# Patient Record
Sex: FEMALE | Race: WHITE | NOT HISPANIC OR LATINO
[De-identification: names, ages, dates, MRNs, and addresses within clinical notes are randomized per-mention and may not be internally consistent; named-entity substitution may affect disease eponyms.]

---

## 2023-07-20 ENCOUNTER — NON-APPOINTMENT (OUTPATIENT)
Age: 83
End: 2023-07-20

## 2023-07-20 ENCOUNTER — APPOINTMENT (OUTPATIENT)
Dept: BREAST CENTER | Facility: CLINIC | Age: 83
End: 2023-07-20
Payer: MEDICARE

## 2023-07-20 VITALS — WEIGHT: 130 LBS | HEIGHT: 66 IN | BODY MASS INDEX: 20.89 KG/M2

## 2023-07-20 DIAGNOSIS — Z80.0 FAMILY HISTORY OF MALIGNANT NEOPLASM OF DIGESTIVE ORGANS: ICD-10-CM

## 2023-07-20 DIAGNOSIS — Z86.39 PERSONAL HISTORY OF OTHER ENDOCRINE, NUTRITIONAL AND METABOLIC DISEASE: ICD-10-CM

## 2023-07-20 DIAGNOSIS — Z78.9 OTHER SPECIFIED HEALTH STATUS: ICD-10-CM

## 2023-07-20 DIAGNOSIS — R92.8 OTHER ABNORMAL AND INCONCLUSIVE FINDINGS ON DIAGNOSTIC IMAGING OF BREAST: ICD-10-CM

## 2023-07-20 DIAGNOSIS — Z80.43 FAMILY HISTORY OF MALIGNANT NEOPLASM OF TESTIS: ICD-10-CM

## 2023-07-20 DIAGNOSIS — C77.3 PERSONAL HISTORY OF MALIGNANT NEOPLASM OF BREAST: ICD-10-CM

## 2023-07-20 DIAGNOSIS — Z85.828 PERSONAL HISTORY OF OTHER MALIGNANT NEOPLASM OF SKIN: ICD-10-CM

## 2023-07-20 DIAGNOSIS — Z85.3 PERSONAL HISTORY OF MALIGNANT NEOPLASM OF BREAST: ICD-10-CM

## 2023-07-20 DIAGNOSIS — Z23 ENCOUNTER FOR IMMUNIZATION: ICD-10-CM

## 2023-07-20 DIAGNOSIS — Z63.4 DISAPPEARANCE AND DEATH OF FAMILY MEMBER: ICD-10-CM

## 2023-07-20 PROCEDURE — 99204 OFFICE O/P NEW MOD 45 MIN: CPT

## 2023-07-20 RX ORDER — GLYBURIDE AND METFORMIN HYDROCHLORIDE 2.5; 5 MG/1; MG/1
2.5-5 TABLET, FILM COATED ORAL
Refills: 0 | Status: ACTIVE | COMMUNITY

## 2023-07-20 RX ORDER — MULTIVIT-MIN/FOLIC/VIT K/LYCOP 400-300MCG
25 MCG TABLET ORAL
Refills: 0 | Status: ACTIVE | COMMUNITY

## 2023-07-20 SDOH — SOCIAL STABILITY - SOCIAL INSECURITY: DISSAPEARANCE AND DEATH OF FAMILY MEMBER: Z63.4

## 2023-07-20 NOTE — HISTORY OF PRESENT ILLNESS
[FreeTextEntry1] : Pt w/ L ILCA detected on Scr Sono (5/22/23)\par Sono (5/22/23): +9mm hypoe nod L 4:00 N2, +vague distortion L 3:00 N4 > Bx of both rec'ed, No adenopathy\par Mammo (6/5/23): HD, irreg 8mm nod L UOQ\par S/P L Sono Core Bx (7/10/23): L 3-4:00 N4: +ILCA (classic), SBR I, ER+, AL+, Her2 1+, Ki67: -10%, L 4:00 N2: +ALH\par S/P Exc R Ax LN (1989)([facility]): +met Ca > chemo/RT/tmx x 1yr\par S/P R cyst asp (21yo)\par Colonoscopy(2013): "WNL"\par PAP/Pelvic (>20ya ): "WNL"  \par No other Breast Surgery, Breast Procedures or Nipple Discharge. \par No FH Breast, Ovarian, Pancreatic Cancer or Melanoma. \par

## 2023-07-20 NOTE — PHYSICAL EXAM
[Normocephalic] : normocephalic [Atraumatic] : atraumatic [Supple] : supple [No Supraclavicular Adenopathy] : no supraclavicular adenopathy [No Cervical Adenopathy] : no cervical adenopathy [No Thyromegaly] : no thyromegaly [Normal Sinus Rhythm] : normal sinus rhythm [Examined in the supine and seated position] : examined in the supine and seated position [No dominant masses] : no dominant masses in right breast  [No dominant masses] : no dominant masses left breast [No Nipple Retraction] : no left nipple retraction [No Nipple Discharge] : no left nipple discharge [No Axillary Lymphadenopathy] : no left axillary lymphadenopathy [No Edema] : no edema [No Rashes] : no rashes [No Ulceration] : no ulceration [de-identified] : +R AX scar. NER\par %, No lymphedema\par  [de-identified] : +L LOQ ecchymosis/induration c/w Bx change\par No other fx's

## 2023-08-01 DIAGNOSIS — R92.8 OTHER ABNORMAL AND INCONCLUSIVE FINDINGS ON DIAGNOSTIC IMAGING OF BREAST: ICD-10-CM

## 2023-08-02 ENCOUNTER — RESULT REVIEW (OUTPATIENT)
Age: 83
End: 2023-08-02

## 2023-10-22 ENCOUNTER — RESULT REVIEW (OUTPATIENT)
Age: 83
End: 2023-10-22

## 2023-11-09 ENCOUNTER — RESULT REVIEW (OUTPATIENT)
Age: 83
End: 2023-11-09

## 2023-11-10 ENCOUNTER — APPOINTMENT (OUTPATIENT)
Dept: BREAST CENTER | Facility: HOSPITAL | Age: 83
End: 2023-11-10

## 2023-11-10 ENCOUNTER — RESULT REVIEW (OUTPATIENT)
Age: 83
End: 2023-11-10

## 2023-11-10 ENCOUNTER — TRANSCRIPTION ENCOUNTER (OUTPATIENT)
Age: 83
End: 2023-11-10

## 2023-11-15 ENCOUNTER — APPOINTMENT (OUTPATIENT)
Dept: BREAST CENTER | Facility: CLINIC | Age: 83
End: 2023-11-15
Payer: MEDICARE

## 2023-11-15 PROCEDURE — 99024 POSTOP FOLLOW-UP VISIT: CPT

## 2023-11-22 ENCOUNTER — APPOINTMENT (OUTPATIENT)
Dept: BREAST CENTER | Facility: CLINIC | Age: 83
End: 2023-11-22
Payer: MEDICARE

## 2023-11-22 PROCEDURE — 99024 POSTOP FOLLOW-UP VISIT: CPT

## 2023-12-04 ENCOUNTER — APPOINTMENT (OUTPATIENT)
Dept: HEMATOLOGY ONCOLOGY | Facility: CLINIC | Age: 83
End: 2023-12-04
Payer: MEDICARE

## 2023-12-04 VITALS
RESPIRATION RATE: 16 BRPM | DIASTOLIC BLOOD PRESSURE: 81 MMHG | OXYGEN SATURATION: 97 % | WEIGHT: 128 LBS | HEIGHT: 65.5 IN | TEMPERATURE: 96.6 F | HEART RATE: 66 BPM | SYSTOLIC BLOOD PRESSURE: 202 MMHG | BODY MASS INDEX: 21.07 KG/M2

## 2023-12-04 VITALS — DIASTOLIC BLOOD PRESSURE: 80 MMHG | SYSTOLIC BLOOD PRESSURE: 196 MMHG

## 2023-12-04 PROCEDURE — 99205 OFFICE O/P NEW HI 60 MIN: CPT | Mod: 25

## 2023-12-27 ENCOUNTER — APPOINTMENT (OUTPATIENT)
Dept: RADIATION ONCOLOGY | Facility: CLINIC | Age: 83
End: 2023-12-27
Payer: MEDICARE

## 2023-12-27 VITALS
RESPIRATION RATE: 16 BRPM | WEIGHT: 130 LBS | HEIGHT: 65 IN | HEART RATE: 56 BPM | BODY MASS INDEX: 21.66 KG/M2 | OXYGEN SATURATION: 97 % | SYSTOLIC BLOOD PRESSURE: 154 MMHG | DIASTOLIC BLOOD PRESSURE: 85 MMHG

## 2023-12-27 DIAGNOSIS — Z92.3 PERSONAL HISTORY OF IRRADIATION: ICD-10-CM

## 2023-12-27 DIAGNOSIS — Z80.1 FAMILY HISTORY OF MALIGNANT NEOPLASM OF TRACHEA, BRONCHUS AND LUNG: ICD-10-CM

## 2023-12-27 DIAGNOSIS — E11.9 TYPE 2 DIABETES MELLITUS W/OUT COMPLICATIONS: ICD-10-CM

## 2023-12-27 DIAGNOSIS — I10 ESSENTIAL (PRIMARY) HYPERTENSION: ICD-10-CM

## 2023-12-27 PROCEDURE — 99204 OFFICE O/P NEW MOD 45 MIN: CPT | Mod: 25

## 2023-12-27 RX ORDER — LISINOPRIL 10 MG/1
10 TABLET ORAL DAILY
Refills: 0 | Status: ACTIVE | COMMUNITY
Start: 2023-12-27

## 2023-12-27 RX ORDER — ATORVASTATIN CALCIUM 10 MG/1
10 TABLET, FILM COATED ORAL
Refills: 0 | Status: DISCONTINUED | COMMUNITY
End: 2023-12-27

## 2023-12-27 RX ORDER — ANASTROZOLE TABLETS 1 MG/1
1 TABLET ORAL DAILY
Qty: 90 | Refills: 3 | Status: DISCONTINUED | COMMUNITY
Start: 2023-12-04 | End: 2023-12-27

## 2023-12-28 NOTE — HISTORY OF PRESENT ILLNESS
[FreeTextEntry1] : Ms. 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 S/P Exc R Ax LN (1989)([facility]): +met Ca > chemo/RT x 6 mos (does not recall chemo meds) tmx x 1yr treated with Dr. Emerson  5/2023 s/p sono +9mm hypoe nod L 4:00 N2, +vague distortion L 3:00 N4 > Bx recommended. No adenopathy  6/5/23 s/p screening mammo revealing irregular 8mm nodule in the L upper outer breast. BIRADS 4  7/10/23 s/p L breast biopsy revealing invasive lobular carcinoma classic type Architectual grade 3 nuclear grade 1 mitotic rate 1 measuring up to 7mm in greatest length in single core. ALH present. ER+/AR+ HER 2 1+ Ki67 0-10%  8/2023 s/p consultation slides revealing: BREAST, LEFT, 3:00 4 CM FN, US GUIDED BIOPSY: - Invasive lobular carcinoma, classic type histologic grade 1, [place] score: 5/9 (T:3,N:1,M:1), measuring 7 mm in a single core tissue - Lobular carcinoma in-situ, classic type (LCIS) - Atypical lobular hyperplasia (ALH) Breast biomarkers (slides available for review): ER: Positive (80%, moderate staining) AR: Positive (>95%, strong staining) HER-2 IHC: Negative (score 1+) Ki-67: 5% Calponin: Positive in in-situ and negative in invasive components E-cadherin P120 results support the lobular phenotype. B. 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 Additional indeterminate 5mm linear non mass enhanciemen tin the L breast at 9:00 recommend Bx and distortion of R breast rec bx  9/2023 s/p MRI guided bx L breast revealing by consultation slides: A. LEFT BREAST 9:00 MRI GUIDED BIOPSY: - Atypical lobular hyperplasia (ALH) - Fibrocystic changes - Columnar cell changes - Duct ectasia - Calcification in benign ducts Breast biomarkers (per outside report; slides not available for review): P63, Calponin, SMM, E-cadherin and P120 immunostain results support the final diagnosis. B. RIGHT BREAST 8:00 MRI GUIDED BIOPSY: - Invasive ductal carcinoma, histological grade 2, Yanira score: 6/9 (T:3,N:2,M:1), measuring 6 mm in greatest dimension - Calcification in benign ducts Breast biomarkers (slides available for review): ER: Positive (>95%, strong) AR: Positive (>95%, strong) Her2 IHC: Negative (score 1+) Ki-67: 5% P63: Negative C. 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  Today she is here for initial consultation.  No oncotype will be done as the patient has refused any chemotherapy.  Patient states that overall, they are feeling well. Patient denies any pain or discomfort in her breast.  No history of limitation of shoulder movements.. Patient mentions that their doctor is recommending Anastrozole, but she does not wish to take the meds due to their side effects.

## 2023-12-28 NOTE — PHYSICAL EXAM
[Breast Appearance] : normal in appearance [Symmetric] : breasts are symmetric [Breast Palpation Mass] : no palpable masses [No UE Edema] : there is no upper extremity edema [Normal] : oriented to person, place and time, the affect was normal, the mood was normal and not anxious [de-identified] : Well-healed lumpectomy scar bilaterally with no underlying induration.  No other lumps palpable.  No lymph nodes palpable in either axilla

## 2024-01-04 ENCOUNTER — NON-APPOINTMENT (OUTPATIENT)
Age: 84
End: 2024-01-04

## 2024-01-16 ENCOUNTER — NON-APPOINTMENT (OUTPATIENT)
Age: 84
End: 2024-01-16

## 2024-01-16 VITALS
SYSTOLIC BLOOD PRESSURE: 113 MMHG | OXYGEN SATURATION: 99 % | DIASTOLIC BLOOD PRESSURE: 79 MMHG | RESPIRATION RATE: 16 BRPM | WEIGHT: 130 LBS | BODY MASS INDEX: 21.63 KG/M2 | HEART RATE: 73 BPM

## 2024-01-16 NOTE — DISEASE MANAGEMENT
[Pathological] : TNM Stage: p [I] : I [TTNM] : 1 [NTNM] : 0 [MTNM] : 0 [de-identified] : Bilat breast 3 of 15 Fxs to 801 cGY

## 2024-01-16 NOTE — REVIEW OF SYSTEMS
[Pruritus: Grade 0] : Pruritus: Grade 0 [Skin Hyperpigmentation: Grade 0] : Skin Hyperpigmentation: Grade 0 [Dermatitis Radiation: Grade 0] : Dermatitis Radiation: Grade 0 [Fatigue: Grade 0] : Fatigue: Grade 0 [Breast Pain: Grade 0] : Breast Pain: Grade 0

## 2024-01-16 NOTE — HISTORY OF PRESENT ILLNESS
[FreeTextEntry1] : FX 3 of 15.  Skin care hydration and nutrition were reviewed with patient who verbalized understanding.  Her appetite remains good and she had no new complaints today

## 2024-01-24 ENCOUNTER — NON-APPOINTMENT (OUTPATIENT)
Age: 84
End: 2024-01-24

## 2024-01-24 VITALS
WEIGHT: 127 LBS | RESPIRATION RATE: 16 BRPM | HEART RATE: 63 BPM | HEIGHT: 65 IN | BODY MASS INDEX: 21.16 KG/M2 | DIASTOLIC BLOOD PRESSURE: 73 MMHG | SYSTOLIC BLOOD PRESSURE: 210 MMHG | OXYGEN SATURATION: 99 %

## 2024-01-24 NOTE — HISTORY OF PRESENT ILLNESS
[FreeTextEntry1] : FX 3 of 15.  Skin care hydration and nutrition were reviewed with patient who verbalized understanding.  Her appetite remains good and she had no new complaints today  1/24/2024 Ms. Richmond presents today for her OTV.  She completed 9/15 fractions for a total of 4272 cGy to bilateral breasts.  The patient denies any pain, redness or itching to the breasts.  She is using Calendula cream and Eucerin twice a day.  Her appetite is healthy.

## 2024-01-24 NOTE — DISEASE MANAGEMENT
[Pathological] : TNM Stage: p [NTNM] : 0 [TTNM] : 1 [MTNM] : 0 [I] : I [de-identified] : Bilat breast 9 of 15 Fxs to 4272 cGY

## 2024-01-30 ENCOUNTER — NON-APPOINTMENT (OUTPATIENT)
Age: 84
End: 2024-01-30

## 2024-01-30 VITALS
OXYGEN SATURATION: 99 % | RESPIRATION RATE: 16 BRPM | DIASTOLIC BLOOD PRESSURE: 75 MMHG | HEIGHT: 65 IN | SYSTOLIC BLOOD PRESSURE: 210 MMHG | BODY MASS INDEX: 21.33 KG/M2 | WEIGHT: 128 LBS | HEART RATE: 67 BPM

## 2024-01-30 NOTE — HISTORY OF PRESENT ILLNESS
[FreeTextEntry1] : FX 3 of 15.  Skin care hydration and nutrition were reviewed with patient who verbalized understanding.  Her appetite remains good and she had no new complaints today  1/24/2024 Ms. Richmond presents today for her OTV.  She completed 9/15 fractions for a total of 2403 cGy to bilateral breasts.  The patient denies any pain, redness or itching to the breasts.  She is using Calendula cream and Eucerin twice a day.  Her appetite is healthy.  1/30/2024 Ms. Richmond presents today for her OTV.  She completed 13/15 fractions for a total of 3471 cGy to bilateral breasts/  The patient denies any pain or discomfort to the breasts.  She has slight redness and itching to the treatment site.  The patient is using Calendula cream.  Her appetite is healthy.

## 2024-01-30 NOTE — DISEASE MANAGEMENT
[Pathological] : TNM Stage: p [I] : I [TTNM] : 1 [NTNM] : 0 [MTNM] : 0 [de-identified] : Bilat breast 13 of 15 Fxs to 3471 cGY

## 2024-01-30 NOTE — PHYSICAL EXAM
[Symmetric] : breasts are symmetric [Breast Palpation Mass] : no palpable masses [No UE Edema] : there is no upper extremity edema [Normal] : oriented to person, place and time, the affect was normal, the mood was normal and not anxious [de-identified] : Mild erythema of the breast bilaterally.  No desquamation

## 2024-02-06 ENCOUNTER — APPOINTMENT (OUTPATIENT)
Dept: HEMATOLOGY ONCOLOGY | Facility: CLINIC | Age: 84
End: 2024-02-06
Payer: MEDICARE

## 2024-02-06 ENCOUNTER — RESULT REVIEW (OUTPATIENT)
Age: 84
End: 2024-02-06

## 2024-02-06 VITALS
DIASTOLIC BLOOD PRESSURE: 80 MMHG | SYSTOLIC BLOOD PRESSURE: 211 MMHG | HEART RATE: 70 BPM | TEMPERATURE: 97.8 F | WEIGHT: 130 LBS | BODY MASS INDEX: 21.66 KG/M2 | OXYGEN SATURATION: 96 % | HEIGHT: 65 IN | RESPIRATION RATE: 18 BRPM

## 2024-02-06 LAB — 25(OH)D3 SERPL-MCNC: 43 NG/ML

## 2024-02-06 PROCEDURE — 99215 OFFICE O/P EST HI 40 MIN: CPT | Mod: 25

## 2024-02-12 ENCOUNTER — APPOINTMENT (OUTPATIENT)
Dept: BREAST CENTER | Facility: CLINIC | Age: 84
End: 2024-02-12
Payer: MEDICARE

## 2024-02-12 VITALS — WEIGHT: 130 LBS | BODY MASS INDEX: 20.89 KG/M2 | HEIGHT: 66 IN

## 2024-02-12 DIAGNOSIS — Z92.21 PERSONAL HISTORY OF ANTINEOPLASTIC CHEMOTHERAPY: ICD-10-CM

## 2024-02-12 DIAGNOSIS — Z80.0 FAMILY HISTORY OF MALIGNANT NEOPLASM OF DIGESTIVE ORGANS: ICD-10-CM

## 2024-02-12 DIAGNOSIS — L58.9 RADIODERMATITIS, UNSPECIFIED: ICD-10-CM

## 2024-02-12 DIAGNOSIS — Z92.3 PERSONAL HISTORY OF IRRADIATION: ICD-10-CM

## 2024-02-12 DIAGNOSIS — D05.02 LOBULAR CARCINOMA IN SITU OF LEFT BREAST: ICD-10-CM

## 2024-02-12 DIAGNOSIS — N60.92 UNSPECIFIED BENIGN MAMMARY DYSPLASIA OF LEFT BREAST: ICD-10-CM

## 2024-02-12 DIAGNOSIS — Z86.010 PERSONAL HISTORY OF COLONIC POLYPS: ICD-10-CM

## 2024-02-12 DIAGNOSIS — R92.30 DENSE BREASTS, UNSPECIFIED: ICD-10-CM

## 2024-02-12 PROCEDURE — 99214 OFFICE O/P EST MOD 30 MIN: CPT

## 2024-02-12 NOTE — HISTORY OF PRESENT ILLNESS
[FreeTextEntry1] : S/P Bilat PMX w/ NL/Reconstx (11/10/23): L: +7mm ILCA/LCIS (classic), SBR II, -LVI, -margins, ER+, OR+, Her2-, Ki67: 5-10%, R: +rare foci susp for microinv Ca, +1.5cm DCIS, int/high grade, -margins (<1mm anterolat) L [id number] ILCA Completed RT (2/24)([facility]) Arimidex rec'ed (Rachid) > pt reluctant to take > discussed Pt w/ L ILCA detected on Scr Sono (5/22/23) Sono (5/22/23): +9mm hypoe nod L 4:00 N2, +vague distortion L 3:00 N4 > Bx of both rec'ed, No adenopathy Mammo (6/5/23): HD, irreg 8mm nod L UOQ S/P L Sono Core Bx (7/10/23): L 3-4:00 N4: +ILCA (classic), SBR I, ER+, OR+, Her2 1+, Ki67: -10%, L 4:00 N2: +ALH S/P Exc R Ax LN (1989)([facility]): +met Ca > chemo/RT/tmx x 1yr S/P R cyst asp (19yo) Colonoscopy(2013): "WNL" PAP/Pelvic (>20ya ): "WNL"   No other Breast Surgery, Breast Procedures or Nipple Discharge.  No FH Breast, Ovarian, Pancreatic Cancer or Melanoma.  No MH/FH changes. Taking Ca/D. Vit D level (2/24): 43. ROS reviewed/discussed. Last Bone Densitometry

## 2024-02-12 NOTE — PHYSICAL EXAM
[Normocephalic] : normocephalic [Atraumatic] : atraumatic [Supple] : supple [No Supraclavicular Adenopathy] : no supraclavicular adenopathy [No Cervical Adenopathy] : no cervical adenopathy [No Thyromegaly] : no thyromegaly [Normal Sinus Rhythm] : normal sinus rhythm [Examined in the supine and seated position] : examined in the supine and seated position [No dominant masses] : no dominant masses in right breast  [No dominant masses] : no dominant masses left breast [No Nipple Retraction] : no left nipple retraction [No Nipple Discharge] : no left nipple discharge [No Axillary Lymphadenopathy] : no left axillary lymphadenopathy [No Edema] : no edema [No Rashes] : no rashes [No Ulceration] : no ulceration [de-identified] : S/P PMX/RT. NER. %. No lymphedema.  +sig RT erythema/dermatits +focal R LOQ induration/retraction [de-identified] : S/P PMX/RT. NER. %. No lymphedema.  +sig RT dermaitis

## 2024-02-16 ENCOUNTER — APPOINTMENT (OUTPATIENT)
Dept: RADIATION ONCOLOGY | Facility: CLINIC | Age: 84
End: 2024-02-16
Payer: MEDICARE

## 2024-02-16 VITALS
WEIGHT: 130 LBS | OXYGEN SATURATION: 98 % | HEART RATE: 75 BPM | DIASTOLIC BLOOD PRESSURE: 82 MMHG | SYSTOLIC BLOOD PRESSURE: 184 MMHG | BODY MASS INDEX: 20.89 KG/M2 | RESPIRATION RATE: 16 BRPM | TEMPERATURE: 98.3 F | HEIGHT: 66 IN

## 2024-02-16 DIAGNOSIS — Z00.00 ENCOUNTER FOR GENERAL ADULT MEDICAL EXAMINATION W/OUT ABNORMAL FINDINGS: ICD-10-CM

## 2024-02-16 PROCEDURE — 99024 POSTOP FOLLOW-UP VISIT: CPT

## 2024-02-16 NOTE — DISEASE MANAGEMENT
[Pathological] : TNM Stage: p [I] : I [TTNM] : 1 [NTNM] : 0 [MTNM] : 0 [de-identified] : Bilat breast 15 of 15 Fxs to 4005 cGY

## 2024-02-16 NOTE — HISTORY OF PRESENT ILLNESS
[FreeTextEntry1] :  2/1/6/2024 Ms. Richmond presents for PTE. She completed 15/15 fractions for a total of 4005cGy to bilateral breasts on 2/1/24.  She is still complaining of persistent itching and redness of her skin on the anterior chest wall although it has gotten better.  No history of limitation of shoulder movement.  She has been using skin care cream once a day.

## 2024-02-16 NOTE — PHYSICAL EXAM
[Symmetric] : breasts are symmetric [Breast Palpation Mass] : no palpable masses [No UE Edema] : there is no upper extremity edema [Normal] : oriented to person, place and time, the affect was normal, the mood was normal and not anxious [de-identified] : Residual erythema and dry desquamation over anterior chest wall bilaterally.  No moist desquamation noted.

## 2024-02-16 NOTE — REVIEW OF SYSTEMS
[Skin Hyperpigmentation: Grade 1 - Hyperpigmentation covering <10% BSA; no psychosocial impact] : Skin Hyperpigmentation: Grade 1 - Hyperpigmentation covering <10% BSA; no psychosocial impact [Pruritus: Grade 1 - Mild or localized; topical intervention indicated] : Pruritus: Grade 1 - Mild or localized; topical intervention indicated [Dermatitis Radiation: Grade 1 - Faint erythema or dry desquamation] : Dermatitis Radiation: Grade 1 - Faint erythema or dry desquamation

## 2024-02-19 NOTE — CONSULT LETTER
[Dear  ___] : Dear  [unfilled], [Consult Letter:] : I had the pleasure of evaluating your patient, [unfilled]. [Please see my note below.] : Please see my note below. [Consult Closing:] : Thank you very much for allowing me to participate in the care of this patient.  If you have any questions, please do not hesitate to contact me. [Sincerely,] : Sincerely, [FreeTextEntry3] : Belle Rashid DO, FACERASTO, FACP Medical Oncology and Hematology [facility]

## 2024-04-05 NOTE — REVIEW OF SYSTEMS
[Diarrhea: Grade 0] : Diarrhea: Grade 0 [Negative] : Allergic/Immunologic [Skin Rash] : skin rash [Anxiety] : anxiety

## 2024-04-05 NOTE — HISTORY OF PRESENT ILLNESS
[de-identified] : Ms. 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   Oncological History:   States many BCCa and SCCa removed   States she had melanoma to R leg excised  R breast cyst at age 20   S/P Exc R Ax LN (1989)([facility]): +met Ca > chemo/RT x 6 mos (does not recall chemo meds) tmx x 1yr treated with Dr. Emerson   5/2023 s/p sono +9mm hypoe nod L 4:00 N2, +vague distortion L 3:00 N4 > Bx recommended. No adenopathy  6/5/23 s/p screening mammo revealing irregular 8mm nodule in the L upper outer breast. BIRADS 4  7/10/23 s/p L breast biopsy revealing invasive lobular carcinoma classic type Architectual grade 3 nuclear grade 1 mitotic rate 1 measuring up to 7mm in greatest length in single core. ALH present. 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 B. 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 Additional indeterminate 5mm linear non mass enhanciemen tin the L breast at 9:00 recommend Bx and distortion of R breast rec bx   9/2023 s/p MRI guided bx L breast revealing by consultation slides:  A. LEFT BREAST 9:00 MRI GUIDED BIOPSY: - Atypical lobular hyperplasia (ALH) - Fibrocystic changes - Columnar cell changes - Duct ectasia - Calcification in benign ducts Breast biomarkers (per outside report; slides not available for review): P63, Calponin, SMM, E-cadherin and P120 immunostain results support the final diagnosis. B. RIGHT BREAST 8:00 MRI GUIDED BIOPSY: - Invasive ductal carcinoma, histological grade 2, Yanira score: 6/9 (T:3,N:2,M:1), measuring 6 mm in greatest dimension - Calcification in benign ducts Breast biomarkers (slides available for review): ER: Positive (>95%, strong) NE: Positive (>95%, strong) Her2 IHC: Negative (score 1+) Ki-67: 5% P63: Negative C. 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, nuclear grade II, micropapillary pattern - Calcification in benign ducts Breast biomarkers (slides available for review): ER: Positive (>95%, strong) NE: Positive (>95%, strong) Her2 IHC: Negative (score 1+) Ki-67: 5-10%  11/2023 s/p bilateral lumpectomy with recon revealing L +7mm ILCA/LCIS (classic), SBR II, -LVI, -margins, ER+, NE+, Her2-, Ki67: 5-10%, R: +rare foci susp for microinv Ca, +1.5cm DCIS, int/high grade, -margins (<1mm anterolat)  Breast Cancer Risk Factors: Menarche: 15 Date of LMP: age 49 following chemo  Menopause: age 49 G0 Age at first live birth: n/a Nursed: n/a Hysterectomy: no Oophorectomy: no OCP: no HRT: no Last pap/pelvic exam: about 30 years ago, has not seen GYN  Related family history: Father prostate cancer ?met to lung vs new primary, mother colon ca, paternal uncle liver ca, paternal cousin leukemia  BRCA testing:  [de-identified] : Patient seen and examined today for follow up. She is now s/p completion of RT. Has some skin eruption without peeling. She is very anxious about dx and starting medication. BP high today taking lisinopril but does not like taking. She remains active. Will be going to [place].

## 2024-04-05 NOTE — PHYSICAL EXAM
[Fully active, able to carry on all pre-disease performance without restriction] : Status 0 - Fully active, able to carry on all pre-disease performance without restriction [Normal] : affect appropriate [de-identified] : s/p lumpectomy with reconstruction - well healed no signs of infection. no masses palpated no axillary adenopathy bilaterally +skin eruptions from RT no dry or moist desquamation

## 2024-04-05 NOTE — ASSESSMENT
[FreeTextEntry1] : #Breast Cancer  - L +7mm ILCA/LCIS (classic), SBR II, -LVI, -margins, ER+, DE+, Her2-, Ki67: 5-10%, R: +rare foci susp for microinv Ca, +1.5cm DCIS, int/high grade, -margins (<1mm anterolat) - s/p bilateral lumpectomy with reconstruction - Reviewed the diagnosis, prognosis and natural history of  ER+, DE+, Her2- ILC - Reviewed the imaging and path - Reviewed the NCCN guidelines and patient expresses understanding - She refuses chemo thus will not send oncotype - Recommend [facility] to discuss role of RT - We did discuss that I would recommend an aromatase inhibitor, Anastrozole, 1mg PO q day for a minimum of 5 years given that her tumor is ER/DE+ and she is post menopausal. We have reviewed the side effects of Anastrozole to include but are not limited to hot flashes, mood swings, arthralgias, bone pain, thinning of the bones including osteopenia/osteoporosis. She will take this with Ca++ 1200 mg PO q day and Vit D 800 IU daily to protect her bone health.  - Will also check a baseline DEXA scan. - She will review the literature and think about endocrine therapy options and get the Bone Density. I have told her ET  would begin after Radiation. She has an appt with RT 12/27 - 2/6/24 vs and CBC reviewed; WBC 4.26, hgb 13, plt 307. Patient presents extremely anxious today. She is now s/p completion of RT. Has some skin eruption without peeling. She is very anxious about dx and starting Anastrazole. She notes she has friends who have side effects and she lives a very active life and does not want changes. She did received DEXA 11/2022 reviewed normal bone density. Patient was Rx Anastrazole and has but is extremely reluctant to start. Again reviewed side effects and logistics. Reviewed role of reducing risk of recurrence. Extensive discussion regarding AI use. She still has skin changes and recovering from RT. She is also going to [place] in 2 weeks. She will continue to think about this and we will check in to see if she started medication. Reviewed would prefer she is on something than nothing however she will think about this and let us know when she starts. We will continue with breast imaging, she will see breast surgery and rad onc and we jordan complete CBE with each visit.   #Bone Density  - 11/2022 DEXA normal bone density  - Maintain healthy BMI  - Limit ETOH  - ca++ vit D and weight bearing exercise  - Monitor q2y   #HTN  - On Lisinopril  - Does not like to take medications  - Advised to take  - May also be component of white coat HTN - Patient is very anxious  #Anxiety  - Does not want to see someone  - [facility] wants to hold off  - Does not like  meds   #Health Maintenance  - GYN advised importance of GYN follow up. States hse does not want to go  - PCP Dr. Young - Derm - Does not want to go   RTC in 2 -3 months with cbc with diff, cmp, vit D  Case and management discussed with Dr. Rashid

## 2024-04-16 ENCOUNTER — NON-APPOINTMENT (OUTPATIENT)
Age: 84
End: 2024-04-16

## 2024-05-14 ENCOUNTER — APPOINTMENT (OUTPATIENT)
Dept: HEMATOLOGY ONCOLOGY | Facility: CLINIC | Age: 84
End: 2024-05-14
Payer: MEDICARE

## 2024-05-14 VITALS
RESPIRATION RATE: 18 BRPM | TEMPERATURE: 97.4 F | HEIGHT: 66 IN | OXYGEN SATURATION: 95 % | HEART RATE: 65 BPM | BODY MASS INDEX: 21.17 KG/M2 | SYSTOLIC BLOOD PRESSURE: 180 MMHG | DIASTOLIC BLOOD PRESSURE: 60 MMHG | WEIGHT: 131.7 LBS

## 2024-05-14 DIAGNOSIS — D05.11 INTRADUCTAL CARCINOMA IN SITU OF RIGHT BREAST: ICD-10-CM

## 2024-05-14 PROCEDURE — 99215 OFFICE O/P EST HI 40 MIN: CPT

## 2024-05-14 RX ORDER — ANASTROZOLE TABLETS 1 MG/1
1 TABLET ORAL DAILY
Qty: 30 | Refills: 0 | Status: ACTIVE | COMMUNITY
Start: 2024-05-14 | End: 1900-01-01

## 2024-05-17 PROBLEM — D05.11 DUCTAL CARCINOMA IN SITU (DCIS) OF RIGHT BREAST: Status: ACTIVE | Noted: 2023-11-22

## 2024-05-17 NOTE — REVIEW OF SYSTEMS
[Diarrhea: Grade 0] : Diarrhea: Grade 0 [Skin Rash] : skin rash [Anxiety] : anxiety [Negative] : Integumentary

## 2024-05-17 NOTE — HISTORY OF PRESENT ILLNESS
[de-identified] : Ms. 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   Oncological History:   States many BCCa and SCCa removed   States she had melanoma to R leg excised  R breast cyst at age 20   S/P Exc R Ax LN (1989)([facility]): +met Ca > chemo/RT x 6 mos (does not recall chemo meds) tmx x 1yr treated with Dr. Emerson   5/2023 s/p sono +9mm hypoe nod L 4:00 N2, +vague distortion L 3:00 N4 > Bx recommended. No adenopathy  6/5/23 s/p screening mammo revealing irregular 8mm nodule in the L upper outer breast. BIRADS 4  7/10/23 s/p L breast biopsy revealing invasive lobular carcinoma classic type Architectual grade 3 nuclear grade 1 mitotic rate 1 measuring up to 7mm in greatest length in single core. ALH present. ER+/OR+ HER 2 1+ Ki67 0-10%   8/2023 s/p consultation slides revealing:  BREAST, LEFT, 3:00 4 CM FN, US GUIDED BIOPSY: - Invasive lobular carcinoma, classic type histologic grade 1, [place] score: 5/9 (T:3,N:1,M:1), measuring 7 mm in a single core tissue - Lobular carcinoma in-situ, classic type (LCIS) - Atypical lobular hyperplasia (ALH) Breast biomarkers (slides available for review): ER: Positive (80%, moderate staining) OR: Positive (>95%, strong staining) HER-2 IHC: Negative (score 1+) Ki-67: 5% Calponin: Positive in in-situ and negative in invasive components E-cadherin P120 results support the lobular phenotype. B. 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 Additional indeterminate 5mm linear non mass enhanciemen tin the L breast at 9:00 recommend Bx and distortion of R breast rec bx   9/2023 s/p MRI guided bx L breast revealing by consultation slides:  A. LEFT BREAST 9:00 MRI GUIDED BIOPSY: - Atypical lobular hyperplasia (ALH) - Fibrocystic changes - Columnar cell changes - Duct ectasia - Calcification in benign ducts Breast biomarkers (per outside report; slides not available for review): P63, Calponin, SMM, E-cadherin and P120 immunostain results support the final diagnosis. B. RIGHT BREAST 8:00 MRI GUIDED BIOPSY: - Invasive ductal carcinoma, histological grade 2, Yanira score: 6/9 (T:3,N:2,M:1), measuring 6 mm in greatest dimension - Calcification in benign ducts Breast biomarkers (slides available for review): ER: Positive (>95%, strong) OR: Positive (>95%, strong) Her2 IHC: Negative (score 1+) Ki-67: 5% P63: Negative C. 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, nuclear grade II, micropapillary pattern - Calcification in benign ducts Breast biomarkers (slides available for review): ER: Positive (>95%, strong) OR: Positive (>95%, strong) Her2 IHC: Negative (score 1+) Ki-67: 5-10%  11/2023 s/p bilateral lumpectomy with recon revealing L +7mm ILCA/LCIS (classic), SBR II, -LVI, -margins, ER+, OR+, Her2-, Ki67: 5-10%, R: +rare foci susp for microinv Ca, +1.5cm DCIS, int/high grade, -margins (<1mm anterolat)  Breast Cancer Risk Factors: Menarche: 15 Date of LMP: age 49 following chemo  Menopause: age 49 G0 Age at first live birth: n/a Nursed: n/a Hysterectomy: no Oophorectomy: no OCP: no HRT: no Last pap/pelvic exam: about 30 years ago, has not seen GYN  Related family history: Father prostate cancer ?met to lung vs new primary, mother colon ca, paternal uncle liver ca, paternal cousin leukemia  BRCA testing:  [de-identified] : Patient seen and examined today for follow up. She is now s/p completion of RT. Skin changes resolved. She just came back from [place] for 3 mos. She is very anxious about dx and starting medication. Never started AI.  BP high today taking lisinopril but does not like taking and did not take today. She remains active.

## 2024-05-17 NOTE — PHYSICAL EXAM
[Fully active, able to carry on all pre-disease performance without restriction] : Status 0 - Fully active, able to carry on all pre-disease performance without restriction [Normal] : affect appropriate [de-identified] : s/p lumpectomy with reconstruction no masses palpated no axillary adenopathy bilaterally

## 2024-05-17 NOTE — ASSESSMENT
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 We have reviewed the side effects of Anastrozole to include but are not limited to hot flashes, mood swings, arthralgias, bone pain, thinning of the bones including osteopenia/osteoporosis. She will take this with Ca++ 1200 mg PO q day and Vit D 800 IU daily to protect her bone health.  - Will also check a baseline DEXA scan. - She will review the literature and think about endocrine therapy options and get the Bone Density. I have told her ET  would begin after Radiation. She has an appt with RT 12/27 - 2/6/24 vs and CBC reviewed; WBC 4.26, hgb 13, plt 307. Patient presents extremely anxious today. She is now s/p completion of RT. Has some skin eruption without peeling. She is very anxious about dx and starting Anastrazole. She notes she has friends who have side effects and she lives a very active life and does not want changes. She did received DEXA 11/2022 reviewed normal bone density. Patient was Rx Anastrazole and has but is extremely reluctant to start. Again reviewed side effects and logistics. Reviewed role of reducing risk of recurrence. Extensive discussion regarding AI use. She still has skin changes and recovering from RT. She is also going to [place] in 2 weeks. She will continue to think about this and we will check in to see if she started medication. Reviewed would prefer she is on something than nothing however she will think about this and let us know when she starts. We will continue with breast imaging, she will see breast surgery and rad onc and we jordan complete CBE with each visit.  - 5/14/24 patient refused blood work today. Patient came back from [place]. Did not start AI. Again very anxious. Extensive conversation with patient about role of AI in setting of both DCIS and ILC in reducing risk of recurrence. She has it at home. Advised this is her decision and she understands the risk of not taking. Would rather have her on something rather than nothing. She is most concerned about QOL. She is very active and golfs. Nervous about side effects of joint pains. Reviewed side effects and logistics. She states she will try it for 1 mos and see us in a 1 mos to check in. She will call us if she decides not to start this. Advised routine follow up with Dr Velazquez and Dr. tapia   #Bone Density  - 11/2022 DEXA normal bone density  - Maintain healthy BMI  - Limit ETOH  - ca++ vit D and weight bearing exercise  - Monitor q2y, due 11/2024   #HTN  - On Lisinopril  - Does not like to take medications  - Advised to take  - May also be component of white coat HTN - Patient is very anxious she did not take her meds today.   #Anxiety  - Does not want to see someone  - [facility] wants to hold off  - Does not like  meds   #Health Maintenance  - GYN advised importance of GYN follow up. States hse does not want to go  - PCP Dr. Young - Derm - Does not want to go   RTC in 1 months with cbc with diff, cmp, vit D  Case and management discussed with Dr. Rashid

## 2024-06-25 ENCOUNTER — APPOINTMENT (OUTPATIENT)
Dept: HEMATOLOGY ONCOLOGY | Facility: CLINIC | Age: 84
End: 2024-06-25
Payer: MEDICARE

## 2024-06-25 ENCOUNTER — RESULT REVIEW (OUTPATIENT)
Age: 84
End: 2024-06-25

## 2024-06-25 ENCOUNTER — LABORATORY RESULT (OUTPATIENT)
Age: 84
End: 2024-06-25

## 2024-06-25 VITALS
SYSTOLIC BLOOD PRESSURE: 185 MMHG | TEMPERATURE: 97.4 F | WEIGHT: 130 LBS | DIASTOLIC BLOOD PRESSURE: 98 MMHG | RESPIRATION RATE: 18 BRPM | OXYGEN SATURATION: 98 % | BODY MASS INDEX: 20.89 KG/M2 | HEIGHT: 66 IN

## 2024-06-25 DIAGNOSIS — C50.912 MALIGNANT NEOPLASM OF UNSPECIFIED SITE OF LEFT FEMALE BREAST: ICD-10-CM

## 2024-06-25 DIAGNOSIS — I10 ESSENTIAL (PRIMARY) HYPERTENSION: ICD-10-CM

## 2024-06-25 PROCEDURE — 99214 OFFICE O/P EST MOD 30 MIN: CPT

## 2024-06-25 RX ORDER — TAMOXIFEN CITRATE 20 MG/1
20 TABLET, FILM COATED ORAL DAILY
Qty: 90 | Refills: 3 | Status: ACTIVE | COMMUNITY
Start: 2024-06-25 | End: 1900-01-01

## 2024-06-25 NOTE — ASSESSMENT
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 We have reviewed the side effects of Anastrozole to include but are not limited to hot flashes, mood swings, arthralgias, bone pain, thinning of the bones including osteopenia/osteoporosis. She will take this with Ca++ 1200 mg PO q day and Vit D 800 IU daily to protect her bone health.  - Will also check a baseline DEXA scan. - She will review the literature and think about endocrine therapy options and get the Bone Density. I have told her ET  would begin after Radiation. She has an appt with RT 12/27 - 2/6/24 vs and CBC reviewed; WBC 4.26, hgb 13, plt 307. Patient presents extremely anxious today. She is now s/p completion of RT. Has some skin eruption without peeling. She is very anxious about dx and starting Anastrazole. She notes she has friends who have side effects and she lives a very active life and does not want changes. She did received DEXA 11/2022 reviewed normal bone density. Patient was Rx Anastrazole and has but is extremely reluctant to start. Again reviewed side effects and logistics. Reviewed role of reducing risk of recurrence. Extensive discussion regarding AI use. She still has skin changes and recovering from RT. She is also going to [place] in 2 weeks. She will continue to think about this and we will check in to see if she started medication. Reviewed would prefer she is on something than nothing however she will think about this and let us know when she starts. We will continue with breast imaging, she will see breast surgery and rad onc and we jordan complete CBE with each visit.  - 5/14/24 patient refused blood work today. Patient came back from [place]. Did not start AI. Again very anxious. Extensive conversation with patient about role of AI in setting of both DCIS and ILC in reducing risk of recurrence. She has it at home. Advised this is her decision and she understands the risk of not taking. Would rather have her on something rather than nothing. She is most concerned about QOL. She is very active and golfs. Nervous about side effects of joint pains. Reviewed side effects and logistics. She states she will try it for 1 mos and see us in a 1 mos to check in. She will call us if she decides not to start this. Advised routine follow up with Dr Velazquez and Dr. tapia  6/25/24 -vs and labs reviewed. labs drawn in office today. wbc, hgb and plts wnl. Does not want to continue AI - she was having heavy legs and couldn't do things around house. She is willing to try tamoxifen. reviewed side effects. Advised routine follow up with Dr Velazquez and Dr. Tapia   #Bone Density  - 11/2022 DEXA normal bone density  - Maintain healthy BMI  - Limit ETOH  - ca++ vit D and weight bearing exercise  - Monitor q2y, due 11/2024   #HTN  - On Lisinopril  - Does not like to take medications  - Advised to take  - May also be component of white coat HTN - Patient is very anxious she did not take her meds today.   #Anxiety  - Does not want to see someone  - [facility] wants to hold off  - Does not like  meds   #Health Maintenance  - GYN advised importance of GYN follow up. States hse does not want to go  - PCP Dr. Young - Derm - Does not want to go   RTC in 3 months with cbc with diff, cmp, vit D [AdvancecareDate] : 06/25/24 [FreeTextEntry2] : Andry raman asked to bring in form at next visit

## 2024-06-25 NOTE — HISTORY OF PRESENT ILLNESS
[de-identified] : Ms. 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   Oncological History:   States many BCCa and SCCa removed   States she had melanoma to R leg excised  R breast cyst at age 20   S/P Exc R Ax LN (1989)([facility]): +met Ca > chemo/RT x 6 mos (does not recall chemo meds) tmx x 1yr treated with Dr. Emerson   5/2023 s/p sono +9mm hypoe nod L 4:00 N2, +vague distortion L 3:00 N4 > Bx recommended. No adenopathy  6/5/23 s/p screening mammo revealing irregular 8mm nodule in the L upper outer breast. BIRADS 4  7/10/23 s/p L breast biopsy revealing invasive lobular carcinoma classic type Architectual grade 3 nuclear grade 1 mitotic rate 1 measuring up to 7mm in greatest length in single core. ALH present. ER+/GA+ HER 2 1+ Ki67 0-10%   8/2023 s/p consultation slides revealing:  BREAST, LEFT, 3:00 4 CM FN, US GUIDED BIOPSY: - Invasive lobular carcinoma, classic type histologic grade 1, [place] score: 5/9 (T:3,N:1,M:1), measuring 7 mm in a single core tissue - Lobular carcinoma in-situ, classic type (LCIS) - Atypical lobular hyperplasia (ALH) Breast biomarkers (slides available for review): ER: Positive (80%, moderate staining) GA: Positive (>95%, strong staining) HER-2 IHC: Negative (score 1+) Ki-67: 5% Calponin: Positive in in-situ and negative in invasive components E-cadherin P120 results support the lobular phenotype. B. 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 Additional indeterminate 5mm linear non mass enhanciemen tin the L breast at 9:00 recommend Bx and distortion of R breast rec bx   9/2023 s/p MRI guided bx L breast revealing by consultation slides:  A. LEFT BREAST 9:00 MRI GUIDED BIOPSY: - Atypical lobular hyperplasia (ALH) - Fibrocystic changes - Columnar cell changes - Duct ectasia - Calcification in benign ducts Breast biomarkers (per outside report; slides not available for review): P63, Calponin, SMM, E-cadherin and P120 immunostain results support the final diagnosis. B. RIGHT BREAST 8:00 MRI GUIDED BIOPSY: - Invasive ductal carcinoma, histological grade 2, [place] score: 6/9 (T:3,N:2,M:1), measuring 6 mm in greatest dimension - Calcification in benign ducts Breast biomarkers (slides available for review): ER: Positive (>95%, strong) GA: Positive (>95%, strong) Her2 IHC: Negative (score 1+) Ki-67: 5% P63: Negative C. 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  She is now s/p completion of RT. Skin changes resolved. Patient remains active.  Started Anastrozole after last visit in May, patient took medication for 30 days. She stopped taking it herself; states he legs felt "rubbery and heavy."  She also endorsed increased fatigue. When she stopped the anastrozole her symptoms resolved.  Patient's BP elevated. She does take lisinopril and has started to keep a log of her BP at home. Always elevated with doctor visits as per patient.

## 2024-06-25 NOTE — REVIEW OF SYSTEMS
[Diarrhea: Grade 0] : Diarrhea: Grade 0 [Anxiety] : anxiety [Negative] : Allergic/Immunologic [Fatigue] : no fatigue [Chest Pain] : no chest pain [Shortness Of Breath] : no shortness of breath [Cough] : no cough [FreeTextEntry2] : no longer has fatigue, but did when she was taking anastrozole

## 2024-06-25 NOTE — PHYSICAL EXAM
[Fully active, able to carry on all pre-disease performance without restriction] : Status 0 - Fully active, able to carry on all pre-disease performance without restriction [Normal] : affect appropriate [de-identified] : s/p lumpectomy with reconstruction no masses palpated no axillary adenopathy bilaterally

## 2024-07-29 ENCOUNTER — NON-APPOINTMENT (OUTPATIENT)
Age: 84
End: 2024-07-29

## 2024-07-30 ENCOUNTER — APPOINTMENT (OUTPATIENT)
Dept: BREAST CENTER | Facility: CLINIC | Age: 84
End: 2024-07-30
Payer: MEDICARE

## 2024-07-30 VITALS — WEIGHT: 130 LBS | BODY MASS INDEX: 21.4 KG/M2 | HEIGHT: 65.5 IN

## 2024-07-30 DIAGNOSIS — D05.02 LOBULAR CARCINOMA IN SITU OF LEFT BREAST: ICD-10-CM

## 2024-07-30 DIAGNOSIS — Z86.010 PERSONAL HISTORY OF COLONIC POLYPS: ICD-10-CM

## 2024-07-30 DIAGNOSIS — C50.912 MALIGNANT NEOPLASM OF UNSPECIFIED SITE OF LEFT FEMALE BREAST: ICD-10-CM

## 2024-07-30 DIAGNOSIS — R92.30 DENSE BREASTS, UNSPECIFIED: ICD-10-CM

## 2024-07-30 DIAGNOSIS — D05.11 INTRADUCTAL CARCINOMA IN SITU OF RIGHT BREAST: ICD-10-CM

## 2024-07-30 DIAGNOSIS — N60.92 UNSPECIFIED BENIGN MAMMARY DYSPLASIA OF LEFT BREAST: ICD-10-CM

## 2024-07-30 DIAGNOSIS — Z80.0 FAMILY HISTORY OF MALIGNANT NEOPLASM OF DIGESTIVE ORGANS: ICD-10-CM

## 2024-07-30 DIAGNOSIS — Z92.21 PERSONAL HISTORY OF ANTINEOPLASTIC CHEMOTHERAPY: ICD-10-CM

## 2024-07-30 DIAGNOSIS — L58.9 RADIODERMATITIS, UNSPECIFIED: ICD-10-CM

## 2024-07-30 DIAGNOSIS — Z92.3 PERSONAL HISTORY OF IRRADIATION: ICD-10-CM

## 2024-07-30 PROCEDURE — 99214 OFFICE O/P EST MOD 30 MIN: CPT

## 2024-07-30 RX ORDER — ATORVASTATIN CALCIUM 80 MG/1
TABLET, FILM COATED ORAL
Refills: 0 | Status: ACTIVE | COMMUNITY

## 2024-07-30 NOTE — PHYSICAL EXAM
[Normocephalic] : normocephalic [Atraumatic] : atraumatic [Supple] : supple [No Supraclavicular Adenopathy] : no supraclavicular adenopathy [No Cervical Adenopathy] : no cervical adenopathy [No Thyromegaly] : no thyromegaly [Normal Sinus Rhythm] : normal sinus rhythm [Examined in the supine and seated position] : examined in the supine and seated position [No dominant masses] : no dominant masses in right breast  [No dominant masses] : no dominant masses left breast [No Nipple Retraction] : no left nipple retraction [No Nipple Discharge] : no left nipple discharge [No Axillary Lymphadenopathy] : no left axillary lymphadenopathy [No Edema] : no edema [No Rashes] : no rashes [No Ulceration] : no ulceration [de-identified] : S/P PMX/RT. NER. %. No lymphedema.  +R LQO retraction [de-identified] : S/P PMX/RT. NER. %. No lymphedema.

## 2024-07-30 NOTE — HISTORY OF PRESENT ILLNESS
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, int/high grade, -margins (<1mm anterolat) L F2xRmLq ILCA Completed RT (2/24)([facility]) Arimidex rec'ed (Rachid) > +joint pains > self d/c'ed > tmx pro/con discussed > pt to consider Pt w/ L ILCA detected on Scr Sono (5/22/23) Sono (5/22/23): +9mm hypoe nod L 4:00 N2, +vague distortion L 3:00 N4 > Bx of both rec'ed, No adenopathy Mammo (6/5/23): HD, irreg 8mm nod L UOQ S/P L Sono Core Bx (7/10/23): L 3-4:00 N4: +ILCA (classic), SBR I, ER+, NE+, Her2 1+, Ki67: -10%, L 4:00 N2: +ALH S/P Exc R Ax LN (1989)([facility]): +met Ca > chemo/RT/tmx x 1yr S/P R cyst asp (19yo) Colonoscopy (2013): "WNL" PAP/Pelvic (>20ya ): "WNL"   No other Breast Surgery, Breast Procedures or Nipple Discharge.  No FH Breast, Ovarian, Pancreatic Cancer or Melanoma.  No MH/FH changes. Taking Ca/D. Vit D level (2/24): 43. ROS reviewed/discussed. Last Bone Densitometry

## 2024-08-06 ENCOUNTER — APPOINTMENT (OUTPATIENT)
Dept: RADIATION ONCOLOGY | Facility: CLINIC | Age: 84
End: 2024-08-06

## 2024-08-06 PROBLEM — C50.911 BILATERAL BREAST CANCER: Status: ACTIVE | Noted: 2024-08-06

## 2024-08-06 PROCEDURE — 99213 OFFICE O/P EST LOW 20 MIN: CPT

## 2024-08-06 NOTE — REVIEW OF SYSTEMS
[Negative] : Endocrine [Fatigue: Grade 0] : Fatigue: Grade 0 [Breast Pain: Grade 0] : Breast Pain: Grade 0 [Pruritus: Grade 0] : Pruritus: Grade 0 [Skin Hyperpigmentation: Grade 0] : Skin Hyperpigmentation: Grade 0 [Dermatitis Radiation: Grade 0] : Dermatitis Radiation: Grade 0

## 2024-08-07 NOTE — DISEASE MANAGEMENT
[Pathological] : TNM Stage: p [I] : I [TTNM] : 1 [NTNM] : 0 [MTNM] : 0 [de-identified] : Bilat breast 15 of 15 Fxs to 4005 cGY

## 2024-08-07 NOTE — PHYSICAL EXAM
[] : no respiratory distress [Exaggerated Use Of Accessory Muscles For Inspiration] : no accessory muscle use [Arterial Pulses Normal] : the arterial pulses were normal [Breast Appearance] : normal in appearance [Breast Palpation Mass] : no palpable masses [Breast Abnormal Lactation (Galactorrhea)] : no nipple discharge [No UE Edema] : there is no upper extremity edema [Nondistended] : nondistended [Supraclavicular Lymph Nodes Enlarged Bilaterally] : supraclavicular [Axillary Lymph Nodes Enlarged Bilaterally] : axillary [Skin Color & Pigmentation] : normal skin color and pigmentation [Normal] : oriented to person, place and time, the affect was normal, the mood was normal and not anxious [de-identified] : Well-healed lumpectomy scar with no underlying induration on both sides.  No hyperpigmentation or telangiectasia noted

## 2024-08-07 NOTE — HISTORY OF PRESENT ILLNESS
[FreeTextEntry1] : 2/1/6/2024 Ms. Richmond presents for PTE. She completed 15/15 fractions for a total of 4005cGy to bilateral breasts on 2/1/24.  She is still complaining of persistent itching and redness of her skin on the anterior chest wall although it has gotten better.  No history of limitation of shoulder movement.  She has been using skin care cream once a day.  8/6/2024 Ms Richmond returns for a follow up today.  She met with Dr Rashid in June.  The patient reported to her that she had stopped taking Anastrozole after 30 days because of leg pain. She has declined to take Tamoxifen as well.  She also saw Dr Bartholomew-Breast Imaging due November 2024. She has healed well; denies any breast pain and has good ROM.

## 2024-08-07 NOTE — DISEASE MANAGEMENT
[Pathological] : TNM Stage: p [I] : I [TTNM] : 1 [NTNM] : 0 [MTNM] : 0 [de-identified] : Bilat breast 15 of 15 Fxs to 4005 cGY

## 2024-08-07 NOTE — PHYSICAL EXAM
[] : no respiratory distress [Exaggerated Use Of Accessory Muscles For Inspiration] : no accessory muscle use [Arterial Pulses Normal] : the arterial pulses were normal [Breast Appearance] : normal in appearance [Breast Palpation Mass] : no palpable masses [Breast Abnormal Lactation (Galactorrhea)] : no nipple discharge [No UE Edema] : there is no upper extremity edema [Nondistended] : nondistended [Supraclavicular Lymph Nodes Enlarged Bilaterally] : supraclavicular [Axillary Lymph Nodes Enlarged Bilaterally] : axillary [Skin Color & Pigmentation] : normal skin color and pigmentation [Normal] : oriented to person, place and time, the affect was normal, the mood was normal and not anxious [de-identified] : Well-healed lumpectomy scar with no underlying induration on both sides.  No hyperpigmentation or telangiectasia noted

## 2024-08-07 NOTE — HISTORY OF PRESENT ILLNESS
[FreeTextEntry1] : 2/1/6/2024 Ms. Richmond presents for PTE. She completed 15/15 fractions for a total of 4005cGy to bilateral breasts on 2/1/24.  She is still complaining of persistent itching and redness of her skin on the anterior chest wall although it has gotten better.  No history of limitation of shoulder movement.  She has been using skin care cream once a day.  8/6/2024 Ms Richmond returns for a follow up today.  She met with Dr Rashid in June.  The patient reported to her that she had stopped taking Anastrozole after 30 days because of leg pain. She has declined to take Tamoxifen as well.  She also saw Dr Bartholomew-Breast Imaging due November 2024. She has healed well; denies any breast pain and has good ROM. Private Auto Walk in

## 2024-08-07 NOTE — DISEASE MANAGEMENT
[Pathological] : TNM Stage: p [I] : I [TTNM] : 1 [NTNM] : 0 [MTNM] : 0 [de-identified] : Bilat breast 15 of 15 Fxs to 4005 cGY

## 2024-10-15 ENCOUNTER — APPOINTMENT (OUTPATIENT)
Dept: HEMATOLOGY ONCOLOGY | Facility: CLINIC | Age: 84
End: 2024-10-15

## 2024-10-15 VITALS
RESPIRATION RATE: 18 BRPM | HEIGHT: 65.5 IN | OXYGEN SATURATION: 98 % | SYSTOLIC BLOOD PRESSURE: 168 MMHG | BODY MASS INDEX: 21.93 KG/M2 | WEIGHT: 133.2 LBS | DIASTOLIC BLOOD PRESSURE: 73 MMHG | TEMPERATURE: 97.8 F | HEART RATE: 72 BPM

## 2024-10-15 DIAGNOSIS — C50.912 MALIGNANT NEOPLASM OF UNSPECIFIED SITE OF LEFT FEMALE BREAST: ICD-10-CM

## 2024-10-15 PROCEDURE — 99214 OFFICE O/P EST MOD 30 MIN: CPT

## 2024-11-20 ENCOUNTER — RESULT REVIEW (OUTPATIENT)
Age: 84
End: 2024-11-20

## 2025-02-04 ENCOUNTER — APPOINTMENT (OUTPATIENT)
Dept: BREAST CENTER | Facility: CLINIC | Age: 85
End: 2025-02-04
Payer: MEDICARE

## 2025-02-04 VITALS — WEIGHT: 130 LBS | BODY MASS INDEX: 20.89 KG/M2 | HEIGHT: 66 IN

## 2025-02-04 DIAGNOSIS — Z86.0100 PERSONAL HISTORY OF COLON POLYPS, UNSPECIFIED: ICD-10-CM

## 2025-02-04 DIAGNOSIS — Z92.3 PERSONAL HISTORY OF IRRADIATION: ICD-10-CM

## 2025-02-04 DIAGNOSIS — D05.02 LOBULAR CARCINOMA IN SITU OF LEFT BREAST: ICD-10-CM

## 2025-02-04 DIAGNOSIS — Z92.21 PERSONAL HISTORY OF ANTINEOPLASTIC CHEMOTHERAPY: ICD-10-CM

## 2025-02-04 DIAGNOSIS — R92.30 DENSE BREASTS, UNSPECIFIED: ICD-10-CM

## 2025-02-04 DIAGNOSIS — D05.11 INTRADUCTAL CARCINOMA IN SITU OF RIGHT BREAST: ICD-10-CM

## 2025-02-04 DIAGNOSIS — C50.912 MALIGNANT NEOPLASM OF UNSPECIFIED SITE OF RIGHT FEMALE BREAST: ICD-10-CM

## 2025-02-04 DIAGNOSIS — Z80.0 FAMILY HISTORY OF MALIGNANT NEOPLASM OF DIGESTIVE ORGANS: ICD-10-CM

## 2025-02-04 DIAGNOSIS — N60.92 UNSPECIFIED BENIGN MAMMARY DYSPLASIA OF LEFT BREAST: ICD-10-CM

## 2025-02-04 DIAGNOSIS — C50.911 MALIGNANT NEOPLASM OF UNSPECIFIED SITE OF RIGHT FEMALE BREAST: ICD-10-CM

## 2025-02-04 PROCEDURE — 99214 OFFICE O/P EST MOD 30 MIN: CPT

## 2025-02-04 RX ORDER — MULTIVIT,IRON,MINERALS/LUTEIN
TABLET ORAL
Refills: 0 | Status: ACTIVE | COMMUNITY

## 2025-02-04 RX ORDER — PNV NO.95/FERROUS FUM/FOLIC AC 28MG-0.8MG
TABLET ORAL
Refills: 0 | Status: ACTIVE | COMMUNITY

## 2025-02-06 ENCOUNTER — APPOINTMENT (OUTPATIENT)
Dept: RADIATION ONCOLOGY | Facility: CLINIC | Age: 85
End: 2025-02-06
Payer: MEDICARE

## 2025-02-06 VITALS
SYSTOLIC BLOOD PRESSURE: 178 MMHG | WEIGHT: 135 LBS | RESPIRATION RATE: 16 BRPM | OXYGEN SATURATION: 100 % | HEART RATE: 64 BPM | HEIGHT: 66 IN | TEMPERATURE: 98 F | BODY MASS INDEX: 21.69 KG/M2 | DIASTOLIC BLOOD PRESSURE: 72 MMHG

## 2025-02-06 DIAGNOSIS — C50.912 MALIGNANT NEOPLASM OF UNSPECIFIED SITE OF LEFT FEMALE BREAST: ICD-10-CM

## 2025-02-06 PROCEDURE — 99213 OFFICE O/P EST LOW 20 MIN: CPT

## 2025-08-26 ENCOUNTER — NON-APPOINTMENT (OUTPATIENT)
Age: 85
End: 2025-08-26

## 2025-08-26 ENCOUNTER — APPOINTMENT (OUTPATIENT)
Dept: BREAST CENTER | Facility: CLINIC | Age: 85
End: 2025-08-26

## 2025-08-26 VITALS
WEIGHT: 125 LBS | SYSTOLIC BLOOD PRESSURE: 163 MMHG | HEIGHT: 65.5 IN | HEART RATE: 60 BPM | BODY MASS INDEX: 20.58 KG/M2 | DIASTOLIC BLOOD PRESSURE: 64 MMHG

## 2025-08-26 DIAGNOSIS — C50.912 MALIGNANT NEOPLASM OF UNSPECIFIED SITE OF LEFT FEMALE BREAST: ICD-10-CM

## 2025-08-26 DIAGNOSIS — Z86.0100 PERSONAL HISTORY OF COLON POLYPS, UNSPECIFIED: ICD-10-CM

## 2025-08-26 DIAGNOSIS — Z80.0 FAMILY HISTORY OF MALIGNANT NEOPLASM OF DIGESTIVE ORGANS: ICD-10-CM

## 2025-08-26 DIAGNOSIS — Z92.3 PERSONAL HISTORY OF IRRADIATION: ICD-10-CM

## 2025-08-26 DIAGNOSIS — D05.11 INTRADUCTAL CARCINOMA IN SITU OF RIGHT BREAST: ICD-10-CM

## 2025-08-26 DIAGNOSIS — Z92.21 PERSONAL HISTORY OF ANTINEOPLASTIC CHEMOTHERAPY: ICD-10-CM

## 2025-08-26 DIAGNOSIS — Z12.31 ENCOUNTER FOR SCREENING MAMMOGRAM FOR MALIGNANT NEOPLASM OF BREAST: ICD-10-CM

## 2025-08-26 DIAGNOSIS — D05.02 LOBULAR CARCINOMA IN SITU OF LEFT BREAST: ICD-10-CM

## 2025-08-26 DIAGNOSIS — N60.92 UNSPECIFIED BENIGN MAMMARY DYSPLASIA OF LEFT BREAST: ICD-10-CM

## 2025-08-26 PROCEDURE — 99213 OFFICE O/P EST LOW 20 MIN: CPT
